# Patient Record
Sex: FEMALE | Race: WHITE | Employment: UNEMPLOYED | URBAN - METROPOLITAN AREA
[De-identification: names, ages, dates, MRNs, and addresses within clinical notes are randomized per-mention and may not be internally consistent; named-entity substitution may affect disease eponyms.]

---

## 2017-10-02 PROBLEM — Z98.1 S/P LAMINECTOMY WITH SPINAL FUSION: Status: ACTIVE | Noted: 2017-10-02

## 2017-10-02 PROBLEM — M96.1 FAILED BACK SYNDROME OF LUMBAR SPINE: Status: ACTIVE | Noted: 2017-10-02

## 2017-10-02 PROBLEM — Z96.89 S/P INSERTION OF SPINAL CORD STIMULATOR: Status: ACTIVE | Noted: 2017-10-02

## 2017-10-02 PROBLEM — G89.29 OTHER CHRONIC PAIN: Status: ACTIVE | Noted: 2017-10-02

## 2017-10-02 PROBLEM — Z98.1 S/P LUMBAR FUSION: Status: ACTIVE | Noted: 2017-10-02

## 2017-10-02 PROBLEM — G89.21 CHRONIC PAIN DUE TO INJURY: Status: ACTIVE | Noted: 2017-10-02

## 2017-10-05 PROBLEM — Z96.89 S/P INSERTION OF SPINAL CORD STIMULATOR: Status: RESOLVED | Noted: 2017-10-02 | Resolved: 2017-10-05

## 2017-10-05 PROBLEM — Z97.8 PRESENCE OF INTRATHECAL PUMP: Status: ACTIVE | Noted: 2017-10-05

## 2023-11-17 ENCOUNTER — HOSPITAL ENCOUNTER (OUTPATIENT)
Facility: HOSPITAL | Age: 43
Setting detail: OBSERVATION
Discharge: HOME | End: 2023-11-18
Attending: EMERGENCY MEDICINE | Admitting: INTERNAL MEDICINE
Payer: COMMERCIAL

## 2023-11-17 ENCOUNTER — APPOINTMENT (OUTPATIENT)
Dept: MRI IMAGING | Facility: HOSPITAL | Age: 43
End: 2023-11-17
Attending: EMERGENCY MEDICINE
Payer: COMMERCIAL

## 2023-11-17 DIAGNOSIS — M54.9 MODERATE BACK PAIN: Primary | ICD-10-CM

## 2023-11-17 DIAGNOSIS — R20.2 PARESTHESIAS: ICD-10-CM

## 2023-11-17 LAB
ALBUMIN SERPL-MCNC: 4.1 G/DL (ref 3.4–5)
ALBUMIN/GLOB SERPL: 1 {RATIO} (ref 1–2)
ALP LIVER SERPL-CCNC: 117 U/L
ALT SERPL-CCNC: 21 U/L
AMPHET UR QL SCN: NEGATIVE
ANION GAP SERPL CALC-SCNC: 6 MMOL/L (ref 0–18)
AST SERPL-CCNC: 17 U/L (ref 15–37)
BASOPHILS # BLD AUTO: 0.05 X10(3) UL (ref 0–0.2)
BASOPHILS NFR BLD AUTO: 0.4 %
BENZODIAZ UR QL SCN: NEGATIVE
BILIRUB SERPL-MCNC: 0.4 MG/DL (ref 0.1–2)
BUN BLD-MCNC: 17 MG/DL (ref 9–23)
CALCIUM BLD-MCNC: 9.5 MG/DL (ref 8.5–10.1)
CANNABINOIDS UR QL SCN: NEGATIVE
CHLORIDE SERPL-SCNC: 111 MMOL/L (ref 98–112)
CO2 SERPL-SCNC: 24 MMOL/L (ref 21–32)
COCAINE UR QL: NEGATIVE
CREAT BLD-MCNC: 0.92 MG/DL
CREAT UR-SCNC: 97.6 MG/DL
EGFRCR SERPLBLD CKD-EPI 2021: 79 ML/MIN/1.73M2 (ref 60–?)
EOSINOPHIL # BLD AUTO: 0.14 X10(3) UL (ref 0–0.7)
EOSINOPHIL NFR BLD AUTO: 1 %
ERYTHROCYTE [DISTWIDTH] IN BLOOD BY AUTOMATED COUNT: 13.7 %
GLOBULIN PLAS-MCNC: 4 G/DL (ref 2.8–4.4)
GLUCOSE BLD-MCNC: 170 MG/DL (ref 70–99)
HCT VFR BLD AUTO: 46.5 %
HGB BLD-MCNC: 15.8 G/DL
IMM GRANULOCYTES # BLD AUTO: 0.07 X10(3) UL (ref 0–1)
IMM GRANULOCYTES NFR BLD: 0.5 %
LYMPHOCYTES # BLD AUTO: 2.83 X10(3) UL (ref 1–4)
LYMPHOCYTES NFR BLD AUTO: 20.8 %
MCH RBC QN AUTO: 30.7 PG (ref 26–34)
MCHC RBC AUTO-ENTMCNC: 34 G/DL (ref 31–37)
MCV RBC AUTO: 90.5 FL
MDMA UR QL SCN: NEGATIVE
MONOCYTES # BLD AUTO: 0.9 X10(3) UL (ref 0.1–1)
MONOCYTES NFR BLD AUTO: 6.6 %
NEUTROPHILS # BLD AUTO: 9.59 X10 (3) UL (ref 1.5–7.7)
NEUTROPHILS # BLD AUTO: 9.59 X10(3) UL (ref 1.5–7.7)
NEUTROPHILS NFR BLD AUTO: 70.7 %
OSMOLALITY SERPL CALC.SUM OF ELEC: 298 MOSM/KG (ref 275–295)
OXYCODONE UR QL SCN: NEGATIVE
PLATELET # BLD AUTO: 289 10(3)UL (ref 150–450)
POTASSIUM SERPL-SCNC: 3.6 MMOL/L (ref 3.5–5.1)
PROT SERPL-MCNC: 8.1 G/DL (ref 6.4–8.2)
RBC # BLD AUTO: 5.14 X10(6)UL
SODIUM SERPL-SCNC: 141 MMOL/L (ref 136–145)
WBC # BLD AUTO: 13.6 X10(3) UL (ref 4–11)

## 2023-11-17 PROCEDURE — 99222 1ST HOSP IP/OBS MODERATE 55: CPT | Performed by: HOSPITALIST

## 2023-11-17 RX ORDER — SENNOSIDES 8.6 MG
17.2 TABLET ORAL NIGHTLY PRN
Status: DISCONTINUED | OUTPATIENT
Start: 2023-11-17 | End: 2023-11-18

## 2023-11-17 RX ORDER — MELATONIN
3 NIGHTLY PRN
Status: DISCONTINUED | OUTPATIENT
Start: 2023-11-17 | End: 2023-11-18

## 2023-11-17 RX ORDER — MORPHINE SULFATE 2 MG/ML
2 INJECTION, SOLUTION INTRAMUSCULAR; INTRAVENOUS EVERY 2 HOUR PRN
Status: DISCONTINUED | OUTPATIENT
Start: 2023-11-17 | End: 2023-11-18

## 2023-11-17 RX ORDER — ENEMA 19; 7 G/133ML; G/133ML
1 ENEMA RECTAL ONCE AS NEEDED
Status: DISCONTINUED | OUTPATIENT
Start: 2023-11-17 | End: 2023-11-18

## 2023-11-17 RX ORDER — MORPHINE SULFATE 4 MG/ML
4 INJECTION, SOLUTION INTRAMUSCULAR; INTRAVENOUS ONCE
Status: COMPLETED | OUTPATIENT
Start: 2023-11-17 | End: 2023-11-17

## 2023-11-17 RX ORDER — POLYETHYLENE GLYCOL 3350 17 G/17G
17 POWDER, FOR SOLUTION ORAL DAILY PRN
Status: DISCONTINUED | OUTPATIENT
Start: 2023-11-17 | End: 2023-11-18

## 2023-11-17 RX ORDER — ESZOPICLONE 2 MG/1
2 TABLET, FILM COATED ORAL NIGHTLY
COMMUNITY

## 2023-11-17 RX ORDER — DULOXETIN HYDROCHLORIDE 60 MG/1
60 CAPSULE, DELAYED RELEASE ORAL 2 TIMES DAILY
COMMUNITY

## 2023-11-17 RX ORDER — ONDANSETRON 2 MG/ML
4 INJECTION INTRAMUSCULAR; INTRAVENOUS EVERY 6 HOURS PRN
Status: DISCONTINUED | OUTPATIENT
Start: 2023-11-17 | End: 2023-11-18

## 2023-11-17 RX ORDER — GABAPENTIN 600 MG/1
600 TABLET ORAL 2 TIMES DAILY
Status: DISCONTINUED | OUTPATIENT
Start: 2023-11-17 | End: 2023-11-18

## 2023-11-17 RX ORDER — HYDROXYZINE HYDROCHLORIDE 25 MG/1
25 TABLET, FILM COATED ORAL 3 TIMES DAILY PRN
COMMUNITY

## 2023-11-17 RX ORDER — SODIUM CHLORIDE 9 MG/ML
INJECTION, SOLUTION INTRAVENOUS CONTINUOUS
Status: ACTIVE | OUTPATIENT
Start: 2023-11-17 | End: 2023-11-18

## 2023-11-17 RX ORDER — DIAZEPAM 5 MG/ML
5 INJECTION, SOLUTION INTRAMUSCULAR; INTRAVENOUS ONCE
Status: COMPLETED | OUTPATIENT
Start: 2023-11-17 | End: 2023-11-17

## 2023-11-17 RX ORDER — MORPHINE SULFATE 4 MG/ML
4 INJECTION, SOLUTION INTRAMUSCULAR; INTRAVENOUS EVERY 2 HOUR PRN
Status: DISCONTINUED | OUTPATIENT
Start: 2023-11-17 | End: 2023-11-18

## 2023-11-17 RX ORDER — BISACODYL 10 MG
10 SUPPOSITORY, RECTAL RECTAL
Status: DISCONTINUED | OUTPATIENT
Start: 2023-11-17 | End: 2023-11-18

## 2023-11-17 RX ORDER — CYCLOBENZAPRINE HCL 10 MG
10 TABLET ORAL 3 TIMES DAILY
Status: DISCONTINUED | OUTPATIENT
Start: 2023-11-17 | End: 2023-11-18

## 2023-11-17 RX ORDER — MORPHINE SULFATE 2 MG/ML
1 INJECTION, SOLUTION INTRAMUSCULAR; INTRAVENOUS EVERY 2 HOUR PRN
Status: DISCONTINUED | OUTPATIENT
Start: 2023-11-17 | End: 2023-11-18

## 2023-11-17 NOTE — ED INITIAL ASSESSMENT (HPI)
Patient was getting pain pump refilled at clinic and states that during the refill, she began feeling an electrical shock down the legs

## 2023-11-18 ENCOUNTER — APPOINTMENT (OUTPATIENT)
Dept: MRI IMAGING | Facility: HOSPITAL | Age: 43
End: 2023-11-18
Attending: INTERNAL MEDICINE
Payer: COMMERCIAL

## 2023-11-18 VITALS
SYSTOLIC BLOOD PRESSURE: 133 MMHG | DIASTOLIC BLOOD PRESSURE: 81 MMHG | HEIGHT: 67 IN | RESPIRATION RATE: 16 BRPM | TEMPERATURE: 99 F | HEART RATE: 82 BPM | OXYGEN SATURATION: 95 % | WEIGHT: 215 LBS | BODY MASS INDEX: 33.74 KG/M2

## 2023-11-18 LAB
ATRIAL RATE: 108 BPM
P AXIS: 61 DEGREES
P-R INTERVAL: 176 MS
Q-T INTERVAL: 326 MS
QRS DURATION: 76 MS
QTC CALCULATION (BEZET): 436 MS
R AXIS: 37 DEGREES
T AXIS: 67 DEGREES
VENTRICULAR RATE: 108 BPM

## 2023-11-18 PROCEDURE — 72146 MRI CHEST SPINE W/O DYE: CPT | Performed by: INTERNAL MEDICINE

## 2023-11-18 PROCEDURE — 99238 HOSP IP/OBS DSCHRG MGMT 30/<: CPT | Performed by: INTERNAL MEDICINE

## 2023-11-18 RX ORDER — ENOXAPARIN SODIUM 100 MG/ML
40 INJECTION SUBCUTANEOUS NIGHTLY
Status: DISCONTINUED | OUTPATIENT
Start: 2023-11-18 | End: 2023-11-18

## 2023-11-18 RX ORDER — GABAPENTIN 600 MG/1
600 TABLET ORAL 3 TIMES DAILY
Status: SHIPPED | COMMUNITY
Start: 2023-11-18

## 2023-11-18 RX ORDER — CYCLOBENZAPRINE HCL 10 MG
10 TABLET ORAL 3 TIMES DAILY
Qty: 20 TABLET | Refills: 0 | Status: SHIPPED | OUTPATIENT
Start: 2023-11-18

## 2023-11-18 RX ORDER — HYDROXYZINE HYDROCHLORIDE 25 MG/1
25 TABLET, FILM COATED ORAL 3 TIMES DAILY PRN
Status: DISCONTINUED | OUTPATIENT
Start: 2023-11-18 | End: 2023-11-18

## 2023-11-18 RX ORDER — DULOXETIN HYDROCHLORIDE 30 MG/1
60 CAPSULE, DELAYED RELEASE ORAL 2 TIMES DAILY
Status: DISCONTINUED | OUTPATIENT
Start: 2023-11-18 | End: 2023-11-18

## 2023-11-18 RX ORDER — GABAPENTIN 600 MG/1
600 TABLET ORAL 3 TIMES DAILY
Status: DISCONTINUED | OUTPATIENT
Start: 2023-11-18 | End: 2023-11-18

## 2023-11-18 NOTE — ED QUICK NOTES
Orders for admission, patient is aware of plan and ready to go upstairs. Any questions, please call ED RN Alice at extension [phone].      Patient Covid vaccination status: Unvaccinated     COVID Test Ordered in ED: None    COVID Suspicion at Admission: N/A    Running Infusions:  None    Mental Status/LOC at time of transport: Alert and orientex 4    Other pertinent information: patient has pain pump   CIWA score: N/A   NIH score:  N/A

## 2023-11-18 NOTE — PROGRESS NOTES
NURSING ADMISSION NOTE      Patient admitted  Oriented to room. Safety precautions initiated. Bed in low position. Call light in reach. Pt in room. [id number] on call Im md updated, pt in a lot of pain awaiting orders.

## 2023-11-18 NOTE — PLAN OF CARE
Per pt, pain pump on & working, per Im md unsure if pain pump is working correctly per er & on call pain md. While asking pt admission questions pt is falling asleep, easily arousable, pt stating \"its been a long day\" when awoken, stating she is exhausted & needs to sleep. Pt very apologetic for falling asleep during questions. Pt resting in bed at this time. Pw in place. Ivf infusing per orders. Tele in place  in place, 4l o2 via nc remains in place from ED. Plan pain md to see pt in am. See orders placed by on call IM MD.  Call light within reach, pt verbalized understanding of pco & call dont fall protocol.

## 2023-11-18 NOTE — PLAN OF CARE
Assumed care of pt at 1600   MRI results called to Dr Tabares &  extremely upset with staff. \  Discharged home

## 2023-11-18 NOTE — PROGRESS NOTES
Patient reporting symptoms of \"nausea and headache, feels like withdrawal symptoms\". Patient expressed to RN she is worried that the pain pump is off. Patient noted to be diaphoretic and uncomfortable. MD Kori Caraballo and MD [facility] paged with above findings. MD Kori Caraballo called this RN with no new orders until Pain service responds with their recommendations. MD [facility] advised this RN to contact patient pain pump rep to evaluate. Patient to provide rep number to this RN. 11:30: [employer] Rep paged via answering service. Waiting call back from Rep. 11:50: [employer] Rep verified they will see patient today after 15:00.

## 2023-11-20 ENCOUNTER — PATIENT OUTREACH (OUTPATIENT)
Dept: CASE MANAGEMENT | Age: 43
End: 2023-11-20

## 2023-11-20 NOTE — PROGRESS NOTES
TCM chart review. No TCM as patient follows with outside [facility] PCP. Encounter closing.

## 2023-11-24 NOTE — PAYOR COMM NOTE
Discharge Notification    Patient Name: Finn Galaviz  Payor: Mariaelena Berkowitz #: [id number]  Authorization Number: [id number]  Admit Date/Time: 11/17/2023 4:00 PM  Discharge Date/Time: 11/18/2023 7:10 PM    Jason Iglesias RN, 11/24/23, 12:08 PM

## 2023-11-27 LAB
Lab: 241.7 NG/ML
Lab: 9.6 NG/ML
Lab: NEGATIVE
Lab: NEGATIVE NG/ML
Lab: POSITIVE